# Patient Record
Sex: FEMALE | Race: BLACK OR AFRICAN AMERICAN | NOT HISPANIC OR LATINO | URBAN - METROPOLITAN AREA
[De-identification: names, ages, dates, MRNs, and addresses within clinical notes are randomized per-mention and may not be internally consistent; named-entity substitution may affect disease eponyms.]

---

## 2023-01-13 ENCOUNTER — HOSPITAL ENCOUNTER (EMERGENCY)
Facility: OTHER | Age: 33
Discharge: HOME | End: 2023-01-14
Attending: EMERGENCY MEDICINE
Payer: MEDICAID

## 2023-01-13 VITALS
TEMPERATURE: 98 F | HEART RATE: 67 BPM | OXYGEN SATURATION: 100 % | RESPIRATION RATE: 16 BRPM | SYSTOLIC BLOOD PRESSURE: 109 MMHG | HEIGHT: 66 IN | DIASTOLIC BLOOD PRESSURE: 63 MMHG | BODY MASS INDEX: 38.25 KG/M2 | WEIGHT: 238 LBS

## 2023-01-13 DIAGNOSIS — Z21 HIV ANTIBODY POSITIVE: ICD-10-CM

## 2023-01-13 DIAGNOSIS — M25.552 LEFT HIP PAIN: Primary | ICD-10-CM

## 2023-01-13 LAB
B-HCG UR QL: NEGATIVE
CTP QC/QA: YES
HCV AB SERPL QL IA: NEGATIVE
HIV 1+2 AB+HIV1 P24 AG SERPL QL IA: POSITIVE

## 2023-01-13 PROCEDURE — 87389 HIV-1 AG W/HIV-1&-2 AB AG IA: CPT | Performed by: EMERGENCY MEDICINE

## 2023-01-13 PROCEDURE — 63600175 PHARM REV CODE 636 W HCPCS: Performed by: PHYSICIAN ASSISTANT

## 2023-01-13 PROCEDURE — 25000003 PHARM REV CODE 250: Performed by: PHYSICIAN ASSISTANT

## 2023-01-13 PROCEDURE — 81025 URINE PREGNANCY TEST: CPT | Performed by: PHYSICIAN ASSISTANT

## 2023-01-13 PROCEDURE — 99284 EMERGENCY DEPT VISIT MOD MDM: CPT

## 2023-01-13 PROCEDURE — 36415 COLL VENOUS BLD VENIPUNCTURE: CPT | Performed by: EMERGENCY MEDICINE

## 2023-01-13 PROCEDURE — 87389 HIV-1 AG W/HIV-1&-2 AB AG IA: CPT | Mod: 91 | Performed by: EMERGENCY MEDICINE

## 2023-01-13 PROCEDURE — 96372 THER/PROPH/DIAG INJ SC/IM: CPT | Performed by: PHYSICIAN ASSISTANT

## 2023-01-13 PROCEDURE — 86803 HEPATITIS C AB TEST: CPT | Performed by: EMERGENCY MEDICINE

## 2023-01-13 RX ORDER — METHOCARBAMOL 500 MG/1
1000 TABLET, FILM COATED ORAL 3 TIMES DAILY PRN
Qty: 18 TABLET | Refills: 0 | Status: SHIPPED | OUTPATIENT
Start: 2023-01-13 | End: 2023-01-14 | Stop reason: SDUPTHER

## 2023-01-13 RX ORDER — NAPROXEN 500 MG/1
500 TABLET ORAL EVERY 12 HOURS PRN
Qty: 14 TABLET | Refills: 0 | Status: SHIPPED | OUTPATIENT
Start: 2023-01-13 | End: 2023-01-14 | Stop reason: SDUPTHER

## 2023-01-13 RX ORDER — METHOCARBAMOL 750 MG/1
1500 TABLET, FILM COATED ORAL
Status: COMPLETED | OUTPATIENT
Start: 2023-01-13 | End: 2023-01-13

## 2023-01-13 RX ORDER — ACETAMINOPHEN 500 MG
1000 TABLET ORAL
Status: COMPLETED | OUTPATIENT
Start: 2023-01-13 | End: 2023-01-13

## 2023-01-13 RX ORDER — KETOROLAC TROMETHAMINE 30 MG/ML
30 INJECTION, SOLUTION INTRAMUSCULAR; INTRAVENOUS
Status: COMPLETED | OUTPATIENT
Start: 2023-01-13 | End: 2023-01-13

## 2023-01-13 RX ORDER — OXYCODONE HYDROCHLORIDE 5 MG/1
5 TABLET ORAL
Status: COMPLETED | OUTPATIENT
Start: 2023-01-13 | End: 2023-01-13

## 2023-01-13 RX ADMIN — OXYCODONE 5 MG: 5 TABLET ORAL at 11:01

## 2023-01-13 RX ADMIN — METHOCARBAMOL 1500 MG: 750 TABLET ORAL at 10:01

## 2023-01-13 RX ADMIN — ACETAMINOPHEN 1000 MG: 500 TABLET, FILM COATED ORAL at 11:01

## 2023-01-13 RX ADMIN — KETOROLAC TROMETHAMINE 30 MG: 30 INJECTION, SOLUTION INTRAMUSCULAR; INTRAVENOUS at 10:01

## 2023-01-13 NOTE — Clinical Note
"Rachel Baldwin" Lorna was seen and treated in our emergency department on 1/13/2023.  She may return to work on 01/18/2023.       If you have any questions or concerns, please don't hesitate to call.      Marcelino Puentes MD"

## 2023-01-14 RX ORDER — NAPROXEN 500 MG/1
500 TABLET ORAL EVERY 12 HOURS PRN
Qty: 14 TABLET | Refills: 0 | Status: SHIPPED | OUTPATIENT
Start: 2023-01-14 | End: 2023-01-21

## 2023-01-14 RX ORDER — OXYCODONE AND ACETAMINOPHEN 5; 325 MG/1; MG/1
1 TABLET ORAL EVERY 6 HOURS PRN
Qty: 12 TABLET | Refills: 0 | Status: SHIPPED | OUTPATIENT
Start: 2023-01-14

## 2023-01-14 RX ORDER — METHOCARBAMOL 500 MG/1
1000 TABLET, FILM COATED ORAL 3 TIMES DAILY PRN
Qty: 18 TABLET | Refills: 0 | Status: SHIPPED | OUTPATIENT
Start: 2023-01-14 | End: 2023-01-19

## 2023-01-14 NOTE — ED PROVIDER NOTES
"     Source of History:  Patient    Chief complaint:  Hip Pain (New onset left hip pain . Denies trauma or falls.)      HPI:  Rachel Rothman is a 32 y.o. female who is presenting to the emergency department with left hip pain.  She states pain radiates into her left buttocks.  Denies trauma or fall.  Patient states that she felt sudden onset of left-sided hip pain yesterday morning.  She states that she had been off work for a couple of weeks and returned yesterday.  She is a teacher.  She denies prior similar pain.  No back pain, saddle anesthesia, urinary incontinence or weakness.  She is not taken analgesics for pain.  No fever, chills or malaise.  This is the extent to the patients complaints today here in the emergency department.    ROS: As per HPI and below:  General: No fever.  No chills.  MSK: + left hip and left buttocks pain   Integument: No abrasions or bruising  Neuro: No numbness or tingling  Allergy/immunology:  Not immunocompromised     Review of patient's allergies indicates:   Allergen Reactions    Sulfa (sulfonamide antibiotics) Edema       PMH:  As per HPI and below:  No past medical history on file.  No past surgical history on file.         Physical Exam:    /74 (BP Location: Left arm, Patient Position: Sitting)   Pulse (!) 115   Temp 99.5 °F (37.5 °C) (Oral)   Resp 18   Ht 5' 6" (1.676 m)   Wt 108 kg (238 lb)   SpO2 100%   BMI 38.41 kg/m²   Nursing note and vital signs reviewed.  Appearance: No acute distress.  Eyes: No conjunctival injection.  Cardiac: 2 + left pedal pulses  Musculoskeletal:  No lumbar spinal midline tenderness.  Mild pain with external rotation.  Negative straight leg raise on the left.  Tenderness to proximal IT band.  Pain reproduced to left medial thigh with resistance against strength. Antalgic gait.  Skin: No rashes seen.  Good turgor.  No abrasions.  No ecchymoses. Cap refill < 2 seconds  Neuro: Normal sensation to light touch  Mental Status:  Alert and " oriented x 3.  Appropriate, conversant.   Labs that have been ordered have been independently reviewed and interpreted by myself.      MDM/ Differential Dx:    32 y.o. female who is otherwise healthy, presenting to the emergency department atraumatic left-sided hip/buttocks pain.  Limb is distally neurovascular intact.  No fever or systemic symptoms.  She is afebrile, nontoxic appearing hemodynamically stable.  Differential includes bursitis, IT band syndrome, musculoskeletal strain, avascular necrosis, sciatica.  Signs or symptoms to suggest septic joint or cauda equina.  ED Course as of 01/14/23 1153   Fri Jan 13, 2023 2152 Left hip x-ray without osseous abnormality. [AG]   0734 Patient reports no relief after Toradol and Robaxin.  Will give additional analgesics and reassess.  Repeat vital signs within normal limits. [AG]   8799 Patient made aware of HIV antigen/antibody positive test.  Will call with positive results regarding supplemental assay. [AG]  Patient requesting additional analgesics. Will give dose of narcotic and send home with NSAIDS and muscle relaxer.       ED Course User Index  [AG] Nabor Purdy PA-C             Diagnostic Impression:    1. Left hip pain    2. HIV antibody positive                   Nabor Purdy PA-C  01/14/23 7685

## 2023-01-14 NOTE — ED TRIAGE NOTES
C/o nontraumatic left hip pain since 1100 yesterday morning. Denies known injury. No vaginal bleeding, or dysuria. Ambulatory with limp

## 2023-01-19 LAB
HIV SUPPLEMENTAL ASSAY INTERPRETATION: NORMAL
HIV-1 RESULT: NEGATIVE
HIV-2 RESULT: NEGATIVE

## 2023-01-26 DIAGNOSIS — B20 HIV INFECTION, UNSPECIFIED SYMPTOM STATUS: Primary | ICD-10-CM

## 2023-01-31 DIAGNOSIS — Z21 HIV ANTIBODY POSITIVE: Primary | ICD-10-CM

## 2023-02-01 ENCOUNTER — LAB VISIT (OUTPATIENT)
Dept: LAB | Facility: OTHER | Age: 33
End: 2023-02-01
Attending: NURSE PRACTITIONER
Payer: MEDICAID

## 2023-02-01 DIAGNOSIS — Z21 HIV ANTIBODY POSITIVE: ICD-10-CM

## 2023-02-01 PROCEDURE — 87538 HIV-2 PROBE&REVRSE TRNSCRIPJ: CPT | Performed by: NURSE PRACTITIONER

## 2023-02-01 PROCEDURE — 36415 COLL VENOUS BLD VENIPUNCTURE: CPT | Performed by: NURSE PRACTITIONER

## 2023-02-04 LAB
HIV 2 RNA SERPL QL NAA+PROBE: NON REACTIVE
HIV1 RNA SERPL QL NAA+PROBE: NON REACTIVE

## 2024-01-12 ENCOUNTER — HOSPITAL ENCOUNTER (EMERGENCY)
Facility: OTHER | Age: 34
Discharge: HOME | End: 2024-01-12
Attending: EMERGENCY MEDICINE
Payer: MEDICAID

## 2024-01-12 VITALS
TEMPERATURE: 97 F | HEART RATE: 71 BPM | OXYGEN SATURATION: 98 % | RESPIRATION RATE: 18 BRPM | DIASTOLIC BLOOD PRESSURE: 81 MMHG | HEIGHT: 66 IN | SYSTOLIC BLOOD PRESSURE: 139 MMHG | BODY MASS INDEX: 37.77 KG/M2 | WEIGHT: 235 LBS

## 2024-01-12 DIAGNOSIS — L25.9 CONTACT DERMATITIS, UNSPECIFIED CONTACT DERMATITIS TYPE, UNSPECIFIED TRIGGER: Primary | ICD-10-CM

## 2024-01-12 PROCEDURE — 63600175 PHARM REV CODE 636 W HCPCS: Performed by: NURSE PRACTITIONER

## 2024-01-12 PROCEDURE — 99284 EMERGENCY DEPT VISIT MOD MDM: CPT

## 2024-01-12 RX ORDER — TRIAMCINOLONE ACETONIDE 1 MG/G
CREAM TOPICAL 2 TIMES DAILY
Qty: 80 G | Refills: 0 | Status: SHIPPED | OUTPATIENT
Start: 2024-01-12

## 2024-01-12 RX ORDER — PREDNISONE 20 MG/1
40 TABLET ORAL
Status: COMPLETED | OUTPATIENT
Start: 2024-01-12 | End: 2024-01-12

## 2024-01-12 RX ORDER — PREDNISONE 20 MG/1
40 TABLET ORAL DAILY
Qty: 6 TABLET | Refills: 0 | Status: SHIPPED | OUTPATIENT
Start: 2024-01-12 | End: 2024-01-15

## 2024-01-12 RX ADMIN — PREDNISONE 40 MG: 20 TABLET ORAL at 05:01

## 2024-01-12 NOTE — Clinical Note
"Rachel "Rcahel" Lorna was seen and treated in our emergency department on 1/12/2024.  She may return to work on 01/15/2024.       If you have any questions or concerns, please don't hesitate to call.      Kristie Wick, ROBINP"

## 2024-01-12 NOTE — ED TRIAGE NOTES
Pt arrived with c/o dry rash to L neck extending to upper chest since Wednesday.  Pt endorses itching an burning sensation.  PT states rash is worse after showering.  Pt denies any new soaps or detergents.  Pt tried treating with witch hazel and vaseline with no relief.  Pt answering questions appropriately, speaking in complete sentences, respirations even and unlabored.  Aao x 4.

## 2024-01-12 NOTE — ED PROVIDER NOTES
"Source of History:  Patient     Chief complaint:  Rash (Rash to left neck/upper chest since Wednesday. )      HPI:  Rachel Rothman is a 33 y.o. female presenting with rash to her left jaw, left neck and left chest she noticed on Wednesday.  She denies any new food, detergent, soap or makeup.  No relief with lotions at home.    This is the extent to the patients complaints today here in the emergency department.    PMH:  As per HPI and below:  History reviewed. No pertinent past medical history.  Past Surgical History:   Procedure Laterality Date    BILATERAL TUBAL LIGATION              Review of patient's allergies indicates:   Allergen Reactions    Sulfa (sulfonamide antibiotics) Edema       ROS: As per HPI and below:  Constitutional: No fever.  No chills.  Eyes: No visual changes.   ENT: No sore throat. No ear pain.  Urinary: No abnormal urination.  MSK: No back pain. No joint pain.   Integument:  Rash    Physical Exam:    /81   Pulse 71   Temp 97.3 °F (36.3 °C) (Oral)   Resp 18   Ht 5' 6" (1.676 m)   Wt 106.6 kg (235 lb)   SpO2 98%   BMI 37.93 kg/m²   Vitals:    01/12/24 1728   BP: 139/81   Pulse: 71   Resp: 18   Temp: 97.3 °F (36.3 °C)   TempSrc: Oral   SpO2: 98%   Weight: 106.6 kg (235 lb)   Height: 5' 6" (1.676 m)       Nursing note and vital signs reviewed.  Constitutional: No acute distress.  Eyes: No conjunctival injection.  Extraocular muscles are intact.  ENT: Normal phonation.  Musculoskeletal: Good range of motion all joints.  No deformities.  Neck supple.  No meningismus. Neurovascularly intact.  Skin:  Papular rash in his left jaw neck and chest with excoriation marks from scratching.  Psych: Appropriate, conversant.      Labs Reviewed - No data to display    No orders to display         MDM/ Differential Dx:  Differential Diagnosis includes, but is not limited to:  Necrotizing fasciitis, erythema multiforme, Villagran-Yakov syndrome, MRSA, drug eruption, allergic reaction/urticatia, " irritant/contact dermatitis, viral exanthem, impetigo, folliculitis         Medical Decision Making  33-year-old female with a contact dermatitis to the left side of her face neck and chest, will discharge patient home with Kenalog and prednisone.  Provided 1st dose of prednisone in the ED.  Discussed needs close follow up with the primary care provider.  She was agreeable with this plan.    Risk  Prescription drug management.             Diagnostic Impression:    1. Contact dermatitis, unspecified contact dermatitis type, unspecified trigger         ED Disposition Condition    Discharge Stable            ED Prescriptions       Medication Sig Dispense Start Date End Date Auth. Provider    triamcinolone acetonide 0.1% (KENALOG) 0.1 % cream Apply topically 2 (two) times daily. 80 g 1/12/2024 -- Kristie Wick FNP    predniSONE (DELTASONE) 20 MG tablet Take 2 tablets (40 mg total) by mouth once daily. for 3 days 6 tablet 1/12/2024 1/15/2024 Kristie Wick FNP          Follow-up Information       Follow up With Specialties Details Why Contact Info    [facility] Emergency Dept Emergency Medicine Go to  If symptoms worsen [address]  [facility] [id number] [phone]             Kristie Wick FNP  01/12/24 5247

## 2024-01-25 ENCOUNTER — HOSPITAL ENCOUNTER (EMERGENCY)
Facility: OTHER | Age: 34
Discharge: HOME | End: 2024-01-26
Attending: EMERGENCY MEDICINE
Payer: MEDICAID

## 2024-01-25 DIAGNOSIS — M25.511 ACUTE PAIN OF RIGHT SHOULDER: Primary | ICD-10-CM

## 2024-01-25 PROCEDURE — 96372 THER/PROPH/DIAG INJ SC/IM: CPT

## 2024-01-25 PROCEDURE — 99284 EMERGENCY DEPT VISIT MOD MDM: CPT

## 2024-01-25 PROCEDURE — 63600175 PHARM REV CODE 636 W HCPCS

## 2024-01-25 RX ORDER — KETOROLAC TROMETHAMINE 30 MG/ML
30 INJECTION, SOLUTION INTRAMUSCULAR; INTRAVENOUS
Status: COMPLETED | OUTPATIENT
Start: 2024-01-25 | End: 2024-01-25

## 2024-01-25 RX ADMIN — KETOROLAC TROMETHAMINE 30 MG: 30 INJECTION, SOLUTION INTRAMUSCULAR; INTRAVENOUS at 11:01

## 2024-01-25 NOTE — Clinical Note
"Rachel Call (Rictiavva)mons was seen and treated in our emergency department on 1/25/2024.  She may return to work on 01/28/2024.       If you have any questions or concerns, please don't hesitate to call.       RN    "

## 2024-01-26 VITALS
OXYGEN SATURATION: 99 % | HEART RATE: 67 BPM | BODY MASS INDEX: 36.96 KG/M2 | RESPIRATION RATE: 18 BRPM | HEIGHT: 66 IN | TEMPERATURE: 98 F | DIASTOLIC BLOOD PRESSURE: 80 MMHG | WEIGHT: 230 LBS | SYSTOLIC BLOOD PRESSURE: 113 MMHG

## 2024-01-26 RX ORDER — NAPROXEN 500 MG/1
500 TABLET ORAL 2 TIMES DAILY WITH MEALS
Qty: 30 TABLET | Refills: 0 | Status: SHIPPED | OUTPATIENT
Start: 2024-01-26

## 2024-01-26 NOTE — ED PROVIDER NOTES
"Encounter Date: 1/25/2024       History     Chief Complaint   Patient presents with    Arm Pain     Pt reports pain to R shoulder/arm after pulling level to lift seat. "I think I strained my muscle".     This is a 34-year-old female who presents to the ED with complaints of right shoulder pain since earlier today.  Patient presents here with her daughter who is checking in for an allergic reaction/urticaria.  Patient reports sitting in a reclined car seat when she reached up to pull the liver of the seat to put the seat back in a seated position, and noted that she felt like she pulled her shoulder muscle.  She describes the pain as aching and nonradiating.  She has not taken anything for her symptoms.  Denies weakness, numbness or tingling, shortness of breath, chest pain, back pain.  This is the extent of the patient's complaints at this time    The history is provided by the patient.     Review of patient's allergies indicates:   Allergen Reactions    Sulfa (sulfonamide antibiotics) Edema     No past medical history on file.  Past Surgical History:   Procedure Laterality Date    BILATERAL TUBAL LIGATION       No family history on file.     Review of Systems  As per HPI above  Physical Exam     Initial Vitals   BP Pulse Resp Temp SpO2   01/25/24 2230 01/25/24 2229 01/25/24 2229 01/25/24 2229 01/25/24 2229   120/67 78 18 97.9 °F (36.6 °C) 98 %      MAP       --                Physical Exam    Constitutional: She appears well-developed and well-nourished.  Non-toxic appearance. She does not appear ill. No distress.   HENT:   Head: Normocephalic and atraumatic.   Eyes: Conjunctivae are normal.   Neck: Neck supple.   Cardiovascular:  Normal rate and regular rhythm.           Pulmonary/Chest: Effort normal. No tachypnea. No respiratory distress.   Musculoskeletal:      Cervical back: Neck supple.      Comments: Pain with abduction of right arm, but full active and passive ROM of right shoulder and right elbow.  No " obvious deformity.  No tenderness to palpation.  Strength 5/5 bilateral upper extremities.  Sensation intact.  Neurovascularly intact.     Neurological: She is alert and oriented to person, place, and time.   Skin: Skin is warm, dry and intact.   Psychiatric: She has a normal mood and affect. Her speech is normal and behavior is normal.         ED Course   Procedures  Labs Reviewed   POCT URINE PREGNANCY          Imaging Results    None          Medications   ketorolac injection 30 mg (30 mg Intramuscular Given 1/25/24 2350)     Medical Decision Making  Evaluation of a 34-year-old female with right shoulder pain after straining it while trying to undo the reclined car seat she was sitting in.  On exam, no focal tenderness to palpation to right shoulder.  Full active and passive range of motion, though mild pain with abduction.  Given mechanism of injury and physical exam, do not feel that exposure to radiation for a low-yield image would be of benefit at this time.  Plan to treat with anti-inflammatories and ice as patient has not yet tried any conservative measures. Patient educated on signs and symptoms to monitor for and when to return to ED. Patient verbalized understanding and agrees with treatment plan. All questions and concerns addressed.      Differential Diagnosis includes, but is not limited to:  Muscle strain, ligament tear/sprain, soft tissue contusion        Amount and/or Complexity of Data Reviewed  Labs: ordered.    Risk  Prescription drug management.                                Clinical Impression:  Final diagnoses:  [M25.511] Acute pain of right shoulder (Primary)          ED Disposition Condition    Discharge Stable          ED Prescriptions       Medication Sig Dispense Start Date End Date Auth. Provider    naproxen (NAPROSYN) 500 MG tablet Take 1 tablet (500 mg total) by mouth 2 (two) times daily with meals. 30 tablet 1/26/2024 -- Yaquelin Mason PA-C          Follow-up Information        Follow up With Specialties Details Why Contact Info    [facility] - Emergency Dept Emergency Medicine Go to  If symptoms worsen [address] [facility] [address] [phone]             Yaquelin Mason PA-C  01/26/24 0031

## 2024-08-19 ENCOUNTER — HOSPITAL ENCOUNTER (EMERGENCY)
Facility: OTHER | Age: 34
Discharge: HOME | End: 2024-08-19
Attending: EMERGENCY MEDICINE

## 2024-08-19 VITALS
HEART RATE: 70 BPM | SYSTOLIC BLOOD PRESSURE: 128 MMHG | DIASTOLIC BLOOD PRESSURE: 80 MMHG | OXYGEN SATURATION: 100 % | RESPIRATION RATE: 20 BRPM | HEIGHT: 66 IN | BODY MASS INDEX: 30.37 KG/M2 | TEMPERATURE: 98 F | WEIGHT: 189 LBS

## 2024-08-19 DIAGNOSIS — S46.912A LEFT SHOULDER STRAIN, INITIAL ENCOUNTER: Primary | ICD-10-CM

## 2024-08-19 PROCEDURE — 99283 EMERGENCY DEPT VISIT LOW MDM: CPT

## 2024-08-19 RX ORDER — IBUPROFEN 800 MG/1
800 TABLET ORAL EVERY 6 HOURS PRN
Qty: 30 TABLET | Refills: 0 | Status: SHIPPED | OUTPATIENT
Start: 2024-08-19

## 2024-08-19 RX ORDER — CYCLOBENZAPRINE HCL 5 MG
5 TABLET ORAL 3 TIMES DAILY PRN
Qty: 20 TABLET | Refills: 0 | Status: SHIPPED | OUTPATIENT
Start: 2024-08-19 | End: 2024-08-24

## 2024-08-19 NOTE — Clinical Note
"Rachel "Rachel" Lorna was seen and treated in our emergency department on 8/19/2024.  She may return to work on 08/22/2024.       If you have any questions or concerns, please don't hesitate to call.      Jese Dorsey II, MD"

## 2024-08-19 NOTE — FIRST PROVIDER EVALUATION
Emergency Department TeleTriage Encounter Note      CHIEF COMPLAINT    Chief Complaint   Patient presents with    Shoulder Pain     L sided shoulder blade pain onset Friday after waking up. 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 Reports pain with looking down or bending down.        VITAL SIGNS   Initial Vitals [08/19/24 1302]   BP Pulse Resp Temp SpO2   128/80 70 20 97.8 °F (36.6 °C) 100 %      MAP       --            ALLERGIES    Review of patient's allergies indicates:   Allergen Reactions    Sulfa (sulfonamide antibiotics) Edema       PROVIDER TRIAGE NOTE  Reports left shoulder pain for the past three days. Slipped out of her work truck a few days before. Pain is constant and worse with certain movements of the LUE. Has not taken any medication for pain.     Limited physical exam via telehealth: The patient is awake, alert, answering questions appropriately and is not in respiratory distress.  As the Teletriage provider, I performed an initial assessment and ordered appropriate labs and imaging studies, if any, to facilitate the patient's care once placed in the ED. Once a room is available, care and a full evaluation will be completed by an alternate ED provider.  Any additional orders and the final disposition will be determined by that provider.  All imaging and labs will not be followed-up by the Teletriage Team, including myself.          ORDERS  Labs Reviewed - No data to display    ED Orders (720h ago, onward)      Start Ordered     Status Ordering Provider    08/19/24 1316 08/19/24 1315  X-Ray Shoulder 2 or More Views Left  1 time imaging         Ordered TOBY ALMARAZ    08/19/24 1316 08/19/24 1315  POCT urine pregnancy  Once         Ordered TOBY ALMARAZ              Virtual Visit Note: The provider triage portion of this emergency department evaluation and documentation was performed via [employer], a HIPAA-compliant telemedicine  application, in concert with a tele-presenter in the room. A face to face patient evaluation with one of my colleagues will occur once the patient is placed in an emergency department room.      DISCLAIMER: This note was prepared with [employer] voice recognition transcription software. Garbled syntax, mangled pronouns, and other bizarre constructions may be attributed to that software system.

## 2024-08-19 NOTE — ED PROVIDER NOTES
Encounter Date: 8/19/2024    SCRIBE #1 NOTE: I, Hanna Arredondo am scribing for, and in the presence of,  Jese Dorsey II, MD. I have scribed the following portions of the note - Other sections scribed: HPI, ROS.       History     Chief Complaint   Patient presents with    Shoulder Pain     L sided shoulder blade pain onset Friday after waking up. 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 Reports pain with looking down or bending down.      Time seen by provider: 1:21 PM    This is a 34 y.o. female who presents with complaint of  left posterior shoulder pain starting three days ago. 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 Patient does not recall any change in activity or heavy lifting. Her pain worsens with lifting and certain movements. Known drug allergy to Sulfa. This is the extent of the patient's complaints at this time.    The history is provided by the patient.     Review of patient's allergies indicates:   Allergen Reactions    Sulfa (sulfonamide antibiotics) Edema     No past medical history on file.  Past Surgical History:   Procedure Laterality Date    BILATERAL TUBAL LIGATION       No family history on file.     Review of Systems  See HPI.    Physical Exam     Initial Vitals [08/19/24 1302]   BP Pulse Resp Temp SpO2   128/80 70 20 97.8 °F (36.6 °C) 100 %      MAP       --         Physical Exam    Nursing note and vitals reviewed.  Constitutional: She appears well-developed and well-nourished.   HENT:   Head: Normocephalic and atraumatic.   Eyes: Conjunctivae are normal.   Neck: Neck supple.   Musculoskeletal:         General: No edema.      Cervical back: Neck supple.      Comments: Reproducible tenderness medial to the left scapula with palpation and movement. No focal bony tenderness of the scapula.  Pain with decreased range of motion of the shoulder, no bony tenderness. Normal strength and sensation throughout.      Neurological: She is alert and oriented to person, place, and time.   Skin: Skin is warm and dry.   Psychiatric: She has a normal mood and affect.         ED Course   Procedures  Labs Reviewed - No data to display       Imaging Results    None          Medications - No data to display  Medical Decision Making  Risk  Prescription drug management.    Patient presents with atraumatic left shoulder pain which seems to be muscular, adjacent to the scapula.  She did have a fall but this was 5 days prior to onset of symptoms and she did not fall onto the left shoulder, seems unrelated.  No bony tenderness or limited range of motion.  No indication for imaging.  Initiate treatment with anti-inflammatory, muscle relaxants.  Advised not to drive operate machinery etc. while we on the muscle relaxant.  Given a work note for 2 further days to allow the patient to rest, recover.  Encouraged follow-up with primary care, especially if symptoms persist        Scribe Attestation:   Scribe #1: I performed the above scribed service and the documentation accurately describes the services I performed. I attest to the accuracy of the note.    Physician Attestation for Scribe: I, [facility], reviewed documentation as scribed in my presence, which is both accurate and complete.                               Clinical Impression:  Final diagnoses:  [S40.974M] Left shoulder strain, initial encounter (Primary)          ED Disposition Condition    Discharge Stable          ED Prescriptions       Medication Sig Dispense Start Date End Date Auth. Provider    ibuprofen (ADVIL,MOTRIN) 800 MG tablet Take 1 tablet (800 mg total) by mouth every 6 (six) hours as needed for Pain. 30 tablet 8/19/2024 -- Jese Dorsey II, MD    cyclobenzaprine (FLEXERIL) 5 MG tablet Take 1 tablet (5 mg total) by mouth 3 (three) times daily as needed for  Muscle spasms. 20 tablet 8/19/2024 8/24/2024 Jese Dorsey II, MD          Follow-up Information       Follow up With Specialties Details Why Contact Info    Primary Care Clinic  Schedule an appointment as soon as possible for a visit in 5 days      [facility] Sports Medicine Primary Care Sports Medicine In 5 days  [address]  [facility] [address]  [phone]             Jese Dorsey II, MD  08/19/24 0800

## 2024-08-19 NOTE — ED TRIAGE NOTES
Pt presents to ED today c/o LUE pain x 1 week ago   Reports recent fall   Denies taking medication for sx   NAD noted

## 2024-09-14 ENCOUNTER — HOSPITAL ENCOUNTER (EMERGENCY)
Facility: OTHER | Age: 34
Discharge: HOME | End: 2024-09-14
Attending: EMERGENCY MEDICINE

## 2024-09-14 VITALS
DIASTOLIC BLOOD PRESSURE: 81 MMHG | RESPIRATION RATE: 17 BRPM | WEIGHT: 189 LBS | HEIGHT: 66 IN | BODY MASS INDEX: 30.37 KG/M2 | SYSTOLIC BLOOD PRESSURE: 131 MMHG | TEMPERATURE: 100 F | HEART RATE: 97 BPM | OXYGEN SATURATION: 100 %

## 2024-09-14 DIAGNOSIS — B34.9 VIRAL ILLNESS: ICD-10-CM

## 2024-09-14 DIAGNOSIS — R68.83 CHILLS (WITHOUT FEVER): Primary | ICD-10-CM

## 2024-09-14 LAB
CTP QC/QA: YES
CTP QC/QA: YES
POC MOLECULAR INFLUENZA A AGN: NEGATIVE
POC MOLECULAR INFLUENZA B AGN: NEGATIVE
SARS-COV-2 RDRP RESP QL NAA+PROBE: NEGATIVE

## 2024-09-14 PROCEDURE — 99283 EMERGENCY DEPT VISIT LOW MDM: CPT

## 2024-09-14 PROCEDURE — 87635 SARS-COV-2 COVID-19 AMP PRB: CPT | Performed by: NURSE PRACTITIONER

## 2024-09-14 PROCEDURE — 25000003 PHARM REV CODE 250: Performed by: NURSE PRACTITIONER

## 2024-09-14 RX ORDER — IBUPROFEN 600 MG/1
600 TABLET ORAL EVERY 6 HOURS PRN
Qty: 20 TABLET | Refills: 0 | Status: SHIPPED | OUTPATIENT
Start: 2024-09-14 | End: 2024-09-19

## 2024-09-14 RX ORDER — ACETAMINOPHEN 500 MG
1000 TABLET ORAL
Status: COMPLETED | OUTPATIENT
Start: 2024-09-14 | End: 2024-09-14

## 2024-09-14 RX ADMIN — ACETAMINOPHEN 1000 MG: 500 TABLET ORAL at 08:09

## 2024-09-14 NOTE — Clinical Note
"Rcahel Baldwin" Lorna was seen and treated in our emergency department on 9/14/2024.  She may return to work on 09/23/2024.       If you have any questions or concerns, please don't hesitate to call.      Mic Guillermo MD"

## 2024-09-15 NOTE — DISCHARGE INSTRUCTIONS
You were seen and evaluated in the ER today.  Your COVID and influenza are both negative.  This is likely a viral illness.  Please increase fluids and rest.  Rotate Tylenol ibuprofen as needed.  Follow up with your PCP as needed.  Please follow-up with your PCP as needed.  Please return to the ED for any worsening symptoms such as chest pain, shortness of breath, fever not controlled with Tylenol or ibuprofen or uncontrolled pain.      Our goal in the emergency department is to always give you outstanding care and exceptional service. You may receive a survey by mail or e-mail in the next week regarding your experience in our ED. We would greatly appreciate your completing and returning the survey. Your feedback provides us with a way to recognize our staff who give very good care and it helps us learn how to improve when your experience was below our aspiration of excellence.

## 2024-09-15 NOTE — ED TRIAGE NOTES
Rachel Lorna, a 34 y.o. female presents to the ED w/ complaint of cough, congestion and chills. Reports symptoms worsened while at work today. Denies any other complaints at this time. Aaox4, NAD noted.    Triage note:  Chief Complaint   Patient presents with    Chills     Chills and runny nose/congestion since last night.     Review of patient's allergies indicates:   Allergen Reactions    Sulfa (sulfonamide antibiotics) Edema     No past medical history on file.

## 2024-09-15 NOTE — ED PROVIDER NOTES
"Source of History:  Patient, chart    Chief complaint:  Chills (Chills and runny nose/congestion since last night.)      HPI:  Rachel Rothman is a 34 y.o. female presenting with cough, congestion and chills since last night.  Patient states symptoms worsened while at work today.  Patient denies any fevers.    This is the extent to the patients complaints today here in the emergency department.    ROS: As per HPI and below:  Constitutional:  Positive for chills.  No fever.    Eyes: No visual changes.  ENT:  Positive for nasal congestion.  No sore throat.    Cardiovascular: No chest pain.  Respiratory: No shortness of breath.  Positive for cough  GI: No abdominal pain.  No nausea or vomiting.  Genito-Urinary: No abnormal urination.  Neurologic: No focal weakness.  No numbness.  No headache.  MSK: no back pain.  Integument: No rashes or lesions.  Hematologic: No easy bruising.  Endocrine: No excessive thirst or urination.    Review of patient's allergies indicates:   Allergen Reactions    Sulfa (sulfonamide antibiotics) Edema       PMH:  As per HPI and below:  No past medical history on file.  Past Surgical History:   Procedure Laterality Date    BILATERAL TUBAL LIGATION              Physical Exam:    /81 (BP Location: Left arm)   Pulse 97   Temp 99.6 °F (37.6 °C) (Oral)   Resp 17   Ht 5' 6" (1.676 m)   Wt 85.7 kg (189 lb)   SpO2 100%   BMI 30.51 kg/m²   Nursing note and vital signs reviewed.  Constitutional: No acute distress.  Nontoxic  Eyes: No conjunctival injection.  Extraocular muscles are intact.  ENT: Oropharynx clear. No tonisillar exudate or swelling. Uvula midline and normal. No elevation of posterior oropharynx.  Nasal congestion with mucosal edema  Cardiovascular: Regular rate and rhythm.  No murmurs. No gallops. No rubs.  Respiratory: Clear to auscultation bilaterally.  Good air movement.  No wheezes.  No rhonchi. No rales. No accessory muscle use.  Abdomen: Soft.  Not distended.  " Nontender.  No guarding.  No rebound. Non-peritoneal.  Musculoskeletal: Good range of motion all joints.  No deformities.  Neck supple.  No meningismus.  Skin: No rashes seen.  Good turgor.  No abrasions.  No ecchymoses.  Neuro: alert and oriented x3,  no focal neurological deficits.  Psych: Appropriate, conversant    MDM    Emergent evaluation of a [age] yo female presenting for cough, congestion and chills since last night.  On exam pt is A&Ox3. VSS. Nonfebrile and nontoxic appearing.  Mucous membranes pink and moist.  Clear nasal discharge with mucosal edema noted.  Tonsils with no redness, erythema or exudates. Breath sounds clear bilaterally.  Abdomen soft and nontender. No rebound or guarding appreciated on exam.   BS WNL.  Pt speaking in full sentences.  Steady gait appreciated. Cap refill < 3 seconds.      History Acquisition   Additional history was acquired from other historians.  Chart    The patient's list of active medical problems, social history, medications, and allergies as documented per RN staff has been reviewed.     Differential Diagnoses   Based on available information and the initial assessment, the working differential diagnoses considered during this evaluation include but are not limited to COVID, influenza, viral illness,, others.    I will get swabs, medicate and reassess.      LABS     Labs Reviewed   SARS-COV-2 RDRP GENE       Result Value    POC Rapid COVID Negative       Acceptable Yes     POCT INFLUENZA A/B MOLECULAR    POC Molecular Influenza A Ag Negative      POC Molecular Influenza B Ag Negative       Acceptable Yes       Additional Consideration   All available testing was considered during the course of this workup.  Comorbidities taken into consideration during the patient's evaluation and treatment include weight, age.    Social determinants of health were taken into consideration during development of our treatment plan.    Medications    acetaminophen tablet 1,000 mg (1,000 mg Oral Given 9/14/24 2041)      ED Course as of 09/14/24 2124   Sat Sep 14, 2024   2120 COVID and influenza negative.  Patient reassessed.  Patient updated on results.  Advised this is likely a viral illness.  Advised she can rotate Tylenol and ibuprofen as needed for fever and body aches.  Increase fluids and rest.  Follow up with PCP as needed.  Strict return to ED precautions discussed.  Patient verbalized understanding of this plan of care.  All questions and concerns addressed    Patient is hemodynamically stable, vital signs are normal. Discharge instructions given. Return to ED precautions discussed. Follow up as directed. Pt verbalized understanding of this plan.  Pt is stable for discharge. [RZ]      ED Course User Index  [RZ] Patricia Denise NP             CLINICAL IMPRESSION  1. Chills (without fever)    2. Viral illness         ED Disposition Condition    Discharge Stable            Instruction:  I see no indication of an emergent process beyond that addressed during our encounter but have duly counseled the patient/family regarding the need for prompt follow-up as well as the indications that should prompt immediate return to the emergency room should new or worrisome developments occur.  The patient/family has been provided with verbal and printed direction regarding our final diagnosis(es) as well as instructions regarding use of OTC and/or Rx medications intended to manage the patient's aforementioned conditions including:  ED Prescriptions       Medication Sig Dispense Start Date End Date Auth. Provider    ibuprofen (ADVIL,MOTRIN) 600 MG tablet Take 1 tablet (600 mg total) by mouth every 6 (six) hours as needed for Pain. 20 tablet 9/14/2024 9/19/2024 Patricia Denise NP          Patient has been advised of following recommended follow-up instructions:  Follow-up Information       Follow up With Specialties Details Why Contact Info    PCP  Schedule an  appointment as soon as possible for a visit  As needed           The patient/family communicates understanding of all this information and all remaining questions and concerns were addressed at this time.      The patient's condition did not warrant review of the  and prescription of controlled substances.      This note was created using dictation software.  This program may occasionally mistype words and phrases.         Patricia Denise, SERENITY  09/14/24 4821

## 2025-03-20 ENCOUNTER — HOSPITAL ENCOUNTER (EMERGENCY)
Facility: OTHER | Age: 35
Discharge: HOME | End: 2025-03-20
Attending: EMERGENCY MEDICINE

## 2025-03-20 VITALS
SYSTOLIC BLOOD PRESSURE: 136 MMHG | RESPIRATION RATE: 15 BRPM | WEIGHT: 212.31 LBS | BODY MASS INDEX: 34.12 KG/M2 | OXYGEN SATURATION: 99 % | HEART RATE: 84 BPM | HEIGHT: 66 IN | DIASTOLIC BLOOD PRESSURE: 71 MMHG | TEMPERATURE: 98 F

## 2025-03-20 DIAGNOSIS — J11.1 INFLUENZA-LIKE SYNDROME: Primary | ICD-10-CM

## 2025-03-20 PROCEDURE — 87635 SARS-COV-2 COVID-19 AMP PRB: CPT | Performed by: EMERGENCY MEDICINE

## 2025-03-20 PROCEDURE — 99283 EMERGENCY DEPT VISIT LOW MDM: CPT

## 2025-03-20 RX ORDER — BENZONATATE 100 MG/1
100 CAPSULE ORAL 3 TIMES DAILY PRN
Qty: 20 CAPSULE | Refills: 0 | Status: SHIPPED | OUTPATIENT
Start: 2025-03-20 | End: 2025-03-30

## 2025-03-21 LAB
OHS QRS DURATION: 84 MS
OHS QTC CALCULATION: 414 MS

## 2025-03-21 NOTE — ED PROVIDER NOTES
"    Source of History:  The patient    Chief complaint:  Influenza (C/o stuffy nose, nasal congestion, chest congestion, sweating, hurts to take a deep breath since yesterday)      HPI:  Rachel Rothman is a 35 y.o. female who complains of cough, congestion runny nose and chest wall pain with taking a deep breath that started yesterday.  Denies any fevers or chills.    This is the extent to the patients complaints today here in the emergency department.    ROS:   See HPI    Review of patient's allergies indicates:   Allergen Reactions    Sulfa (sulfonamide antibiotics) Edema       PMH:  As per HPI and below:  No past medical history on file.  Past Surgical History:   Procedure Laterality Date    BILATERAL TUBAL LIGATION         Social History[1]    Physical Exam:    /71 (BP Location: Left arm)   Pulse 84   Temp 97.7 °F (36.5 °C) (Oral)   Resp 15   Ht 5' 6" (1.676 m)   Wt 96.3 kg (212 lb 4.9 oz)   SpO2 99%   BMI 34.27 kg/m²   Nursing note and vital signs reviewed.  Constitutional: No acute distress.  Nontoxic  Eyes: No conjunctival injection.  Extraocular muscles are intact.  ENT: Oropharynx clear. No tonisillar exudate or swelling. Uvula midline and normal. No elevation of posterior oropharynx.  Nasal congestion with mucosal edema  Cardiovascular: Regular rate and rhythm.  No murmurs. No gallops. No rubs.  Respiratory: Clear to auscultation bilaterally.  Good air movement.  No wheezes.  No rhonchi. No rales. No accessory muscle use.  Musculoskeletal: Neck supple.  No meningismus.  Skin: No rashes seen.  Good turgor.  No abrasions.  No ecchymoses.      MDM/ Differential Dx:  Patient has signs and symptoms viral upper respiratory infection/viral syndrome.  Possibility of COVID or influenza.  I have considered but do not suspect pneumonia I do not feel imaging is indicated.  The patient's vital signs are otherwise are stable and do not feel any further workup is indicated.  The patient does not appear to " be septic and no evidence of peritonsillar abscess or retropharyngeal abscess.  Also consider but do not suspect strep pharyngitis or mononucleosis.  Will treat with symptomatic care as an outpatient.                   Diagnostic Impression:    1. Influenza-like syndrome         ED Disposition Condition    Discharge Stable            ED Prescriptions       Medication Sig Dispense Start Date End Date Auth. Provider    benzonatate (TESSALON) 100 MG capsule Take 1 capsule (100 mg total) by mouth 3 (three) times daily as needed for Cough. 20 capsule 3/20/2025 3/30/2025 Alex Mock, DO          Follow-up Information    None            [1]         Alex Mock, DO  03/20/25 2100

## 2025-03-21 NOTE — DISCHARGE INSTRUCTIONS
You can use Afrin (oxymetazoline) over-the-counter.  The store brand generic is just as good and half the price of the brand name.  Use before bedtime.  Use no more than 3 days.    Recommend hot tea, honey and lemon for your sore throat and getting fluids in.  You can use Tylenol or ibuprofen as needed for body aches and chills.    Would also recommend Mucinex over-the-counter for mucus production.

## 2025-03-26 ENCOUNTER — HOSPITAL ENCOUNTER (EMERGENCY)
Facility: OTHER | Age: 35
Discharge: HOME | End: 2025-03-26
Attending: EMERGENCY MEDICINE

## 2025-03-26 VITALS
WEIGHT: 212 LBS | BODY MASS INDEX: 34.07 KG/M2 | HEIGHT: 66 IN | DIASTOLIC BLOOD PRESSURE: 80 MMHG | HEART RATE: 77 BPM | OXYGEN SATURATION: 98 % | TEMPERATURE: 99 F | SYSTOLIC BLOOD PRESSURE: 135 MMHG | RESPIRATION RATE: 16 BRPM

## 2025-03-26 DIAGNOSIS — M79.644 PAIN OF FINGER OF RIGHT HAND: Primary | ICD-10-CM

## 2025-03-26 PROCEDURE — 99283 EMERGENCY DEPT VISIT LOW MDM: CPT | Mod: 25

## 2025-03-26 PROCEDURE — 25000003 PHARM REV CODE 250: Performed by: NURSE PRACTITIONER

## 2025-03-26 RX ORDER — IBUPROFEN 600 MG/1
600 TABLET ORAL EVERY 6 HOURS PRN
Qty: 20 TABLET | Refills: 0 | Status: SHIPPED | OUTPATIENT
Start: 2025-03-26

## 2025-03-26 RX ORDER — IBUPROFEN 600 MG/1
600 TABLET ORAL
Status: COMPLETED | OUTPATIENT
Start: 2025-03-26 | End: 2025-03-26

## 2025-03-26 RX ADMIN — IBUPROFEN 600 MG: 600 TABLET, FILM COATED ORAL at 12:03

## 2025-03-26 NOTE — ED TRIAGE NOTES
Reports pain to right middle finger for past 3 days with no h/o injury. States pain is constant, eases when elevating right arm. Patient is right hand dominant and works at the [employer]. States she does a lot of gripping, lifting, and squeezing with right hand. Taking Tylenol without relief.

## 2025-03-26 NOTE — Clinical Note
"Rachel Baldwin" Lorna was seen and treated in our emergency department on 3/26/2025.  She may return to work on 03/28/2025.       If you have any questions or concerns, please don't hesitate to call.      Colin PENA, ED RN    "

## 2025-03-26 NOTE — ED PROVIDER NOTES
"Encounter Date: 3/26/2025       History     Chief Complaint   Patient presents with    Finger Pain     Pt reports "shooting" pain and swelling to R 3rd digit x 3 days, denies any injury.      Patient is a 35-year-old female who presents to the emergency department with right 3rd digit pain.  Patient reports over the last couple of days she has had pain on the volar aspect of the finger.  Denies any swelling.  Denies any open wounds.  Denies redness or warmth.  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  Reports the pain is worse with movement.  Denies numbness or tingling.    The history is provided by the patient.     Review of patient's allergies indicates:   Allergen Reactions    Sulfa (sulfonamide antibiotics) Edema     No past medical history on file.  Past Surgical History:   Procedure Laterality Date    BILATERAL TUBAL LIGATION       No family history on file.  Social History[1]  Review of Systems   Constitutional:  Negative for activity change, appetite change, chills, fatigue and fever.   HENT:  Negative for congestion, ear discharge, ear pain, postnasal drip, rhinorrhea, sore throat and trouble swallowing.    Respiratory:  Negative for cough and shortness of breath.    Cardiovascular:  Negative for chest pain.   Gastrointestinal:  Negative for abdominal pain, blood in stool, constipation, diarrhea, nausea and vomiting.   Genitourinary:  Negative for dysuria.   Musculoskeletal:  Negative for back pain, neck pain and neck stiffness.        Finger pain   Skin:  Negative for rash and wound.   Neurological:  Negative for dizziness, light-headedness and headaches.       Physical Exam     Initial Vitals [03/26/25 1125]   BP Pulse Resp Temp SpO2   135/80 77 16 98.5 °F (36.9 °C) 98 %      MAP       --         Physical Exam    Nursing note and vitals reviewed.  Constitutional: She appears well-developed and well-nourished. She is not diaphoretic. "  Non-toxic appearance. No distress.   HENT:   Head: Normocephalic.   Right Ear: Hearing and external ear normal.   Left Ear: Hearing and external ear normal.   Nose: Nose normal. Mouth/Throat: Oropharynx is clear and moist. No oropharyngeal exudate.   Eyes: Conjunctivae are normal. Pupils are equal, round, and reactive to light.   Neck:   Normal range of motion.  Cardiovascular:  Normal rate and regular rhythm.           Pulmonary/Chest: Breath sounds normal.   Abdominal: Abdomen is soft. Bowel sounds are normal. There is no abdominal tenderness.   Musculoskeletal:        Hands:       Cervical back: Normal range of motion.     Neurological: She is alert and oriented to person, place, and time.   Skin: Skin is warm and dry. Capillary refill takes less than 2 seconds.   Psychiatric: She has a normal mood and affect.         ED Course   Procedures  Labs Reviewed   HEPATITIS C ANTIBODY   HIV 1 / 2 ANTIBODY          Imaging Results              X-Ray Hand 3 view Right (Final result)  Result time 03/26/25 11:54:31      Final result by Vanda Hodges MD (03/26/25 11:54:31)                   Impression:      No acute osseous abnormality identified.  Nonspecific high-density focus along the soft tissues radial aspect base of the middle phalanx 2nd digit.  This may represent a small calcification or potentially radiopaque retained foreign body.      Electronically signed by: Vanda Hodges  Date:    03/26/2025  Time:    11:54               Narrative:    EXAMINATION:  XR HAND COMPLETE 3 VIEW RIGHT    CLINICAL HISTORY:  hand pain;    TECHNIQUE:  PA, lateral, and oblique views of the right hand were performed.    COMPARISON:  None    FINDINGS:  No acute fracture or dislocation seen.  Nonspecific ossific density along the radial aspect of the trapezium.  Small high-density focus along the radial aspect base of the middle phalanx 2nd digit.    No significant osteophyte formation, osseous erosions, or joint space  narrowing.    No significant soft tissue edema or radiopaque retained foreign body.                                       Medications   ibuprofen tablet 600 mg (600 mg Oral Given 3/26/25 1230)     Medical Decision Making  Urgent evaluation of a 35-year-old female who presents to the emergency department with right 3rd digit pain.  Patient is afebrile and nontoxic appearing.  On exam there is no significant obvious abnormality.  No swelling, redness, or warmth.  No concern for gout or infection.  No rashes or wounds.  X-ray shows no osseous injury.  I suspect tendonitis versus finger sprain.  Will be treated with anti-inflammatories.  Advised to follow up with hand clinic.  Advised to return to the emergency department with any worsening symptoms or concerns.    Amount and/or Complexity of Data Reviewed  Labs: ordered.                                      Clinical Impression:  Final diagnoses:  [M79.644] Pain of finger of right hand (Primary)          ED Disposition Condition    Discharge Stable          ED Prescriptions    None       Follow-up Information    None            [1]         Deena Roach PA-C  03/26/25 1240

## 2025-03-26 NOTE — DISCHARGE INSTRUCTIONS
I a.m. sending you home with anti-inflammatories.  Rest and ice.  Follow up with hand clinic.  Return to the emergency department with any worsening symptoms or concerns.  Thank you for allowing us to take care of you today.

## 2025-03-26 NOTE — FIRST PROVIDER EVALUATION
" Emergency Department TeleTriage Encounter Note      CHIEF COMPLAINT    Chief Complaint   Patient presents with    Finger Pain     Pt reports "shooting" pain and swelling to R 3rd digit x 3 days, denies any injury.        VITAL SIGNS   Initial Vitals [03/26/25 1125]   BP Pulse Resp Temp SpO2   135/80 77 16 98.5 °F (36.9 °C) 98 %      MAP       --            ALLERGIES    Review of patient's allergies indicates:   Allergen Reactions    Sulfa (sulfonamide antibiotics) Edema       PROVIDER TRIAGE NOTE  This is a teletriage evaluation of a 35 y.o. female presenting to the ED complaining of right third finger pain and swelling for three days. No known trauma.    Alert, no distress.     Initial orders will be placed and care will be transferred to an alternate provider when patient is roomed for a full evaluation. Any additional orders and the final disposition will be determined by that provider.         ORDERS  Labs Reviewed   HEPATITIS C ANTIBODY   HIV 1 / 2 ANTIBODY       ED Orders (720h ago, onward)      Start Ordered     Status Ordering Provider    03/26/25 1145 03/26/25 1131  ibuprofen tablet 600 mg  ED 1 Time         Ordered CANDELARIO RAMIREZ N.    03/26/25 1132 03/26/25 1131  X-Ray Hand 3 view Right  1 time imaging         Ordered CANDELARIO RAMIREZ N.    03/26/25 1127 03/26/25 1126  Hepatitis C Antibody  STAT         Ordered MINE ROMERO    03/26/25 1127 03/26/25 1126  HIV 1/2 Ag/Ab (4th Gen)  STAT         Ordered MINE ROMERO              Virtual Visit Note: The provider triage portion of this emergency department evaluation and documentation was performed via [employer], a HIPAA-compliant telemedicine application, in concert with a tele-presenter in the room. A face to face patient evaluation with one of my colleagues will occur once the patient is placed in an emergency department room.      DISCLAIMER: This note was prepared with [employer] voice recognition transcription software. Misty" syntax, mangled pronouns, and other bizarre constructions may be attributed to that software system.